# Patient Record
Sex: MALE | Race: OTHER | HISPANIC OR LATINO
[De-identification: names, ages, dates, MRNs, and addresses within clinical notes are randomized per-mention and may not be internally consistent; named-entity substitution may affect disease eponyms.]

---

## 2019-01-29 ENCOUNTER — APPOINTMENT (OUTPATIENT)
Dept: SURGERY | Facility: CLINIC | Age: 24
End: 2019-01-29
Payer: SELF-PAY

## 2019-01-29 VITALS
WEIGHT: 315 LBS | TEMPERATURE: 99.7 F | DIASTOLIC BLOOD PRESSURE: 77 MMHG | HEIGHT: 74 IN | SYSTOLIC BLOOD PRESSURE: 149 MMHG | BODY MASS INDEX: 40.43 KG/M2 | HEART RATE: 75 BPM

## 2019-01-29 DIAGNOSIS — Q43.3 CONGENITAL MALFORMATIONS OF INTESTINAL FIXATION: ICD-10-CM

## 2019-01-29 DIAGNOSIS — Z86.39 PERSONAL HISTORY OF OTHER ENDOCRINE, NUTRITIONAL AND METABOLIC DISEASE: ICD-10-CM

## 2019-01-29 DIAGNOSIS — Z83.3 FAMILY HISTORY OF DIABETES MELLITUS: ICD-10-CM

## 2019-01-29 DIAGNOSIS — F17.200 NICOTINE DEPENDENCE, UNSPECIFIED, UNCOMPLICATED: ICD-10-CM

## 2019-01-29 DIAGNOSIS — L05.01 PILONIDAL CYST WITH ABSCESS: ICD-10-CM

## 2019-01-29 DIAGNOSIS — Z82.49 FAMILY HISTORY OF ISCHEMIC HEART DISEASE AND OTHER DISEASES OF THE CIRCULATORY SYSTEM: ICD-10-CM

## 2019-01-29 DIAGNOSIS — L05.91 PILONIDAL CYST W/OUT ABSCESS: ICD-10-CM

## 2019-01-29 PROBLEM — Z00.00 ENCOUNTER FOR PREVENTIVE HEALTH EXAMINATION: Status: ACTIVE | Noted: 2019-01-29

## 2019-01-29 PROCEDURE — 99202 OFFICE O/P NEW SF 15 MIN: CPT

## 2019-01-29 RX ORDER — METFORMIN HYDROCHLORIDE 1000 MG/1
1000 TABLET, COATED ORAL
Refills: 0 | Status: ACTIVE | COMMUNITY

## 2019-01-29 NOTE — ASSESSMENT
[FreeTextEntry1] : 22 yo M with second occurrence of pilonidal cyst that spontaneously drained 1 day ago. There is no evidence of acute infection or drainable abscess at this time.

## 2019-01-29 NOTE — REVIEW OF SYSTEMS
[Fever] : no fever [Chills] : no chills [Feeling Poorly] : not feeling poorly [Feeling Tired] : not feeling tired [Negative] : Constitutional [FreeTextEntry1] : headaches

## 2019-01-29 NOTE — PLAN
[FreeTextEntry1] : Recommended careful attention to glucose control with frequent checks, keeping area clean and dry. He was prescribed antibiotics at urgent care which I recommended he start if he develops worsening symptoms with more pain, drainage, or worsening fingersticks. Should his symptoms recur, advised to call the office.

## 2019-01-29 NOTE — HISTORY OF PRESENT ILLNESS
[de-identified] : 22 yo M with history of pilonidal cyst about 1 year ago which was treated by a limited incision and drainage at an urgent care center presents with a recurrence that began 2 days ago. He felt pressure in the area and applied a warm compress. The following day it drained a brownish fluid spontaneously and since then has mostly subsided with decreased pain and drainage. He denies any fevers/chills. He is diabetic and his sugars have been well-controlled with glucose of 130 this am. He presented to an urgent care this am where he was prescribed antibiotics and referred to surgery.

## 2019-01-29 NOTE — PHYSICAL EXAM
[Respiratory Effort] : normal respiratory effort [Pilonidal Cyst] : a pilonidal cyst [de-identified] : NAD, well-appearing [de-identified] : s [de-identified] : Minimal skin excoriation just at gluteal fold, mild induration on left but no fluctuance, erythema or drainage that would be expressed; no significant discomfort with exam; tract opening probed but unable to clearly delineate

## 2019-02-14 ENCOUNTER — APPOINTMENT (OUTPATIENT)
Dept: SURGERY | Facility: CLINIC | Age: 24
End: 2019-02-14

## 2020-05-16 ENCOUNTER — TRANSCRIPTION ENCOUNTER (OUTPATIENT)
Age: 25
End: 2020-05-16

## 2024-02-11 ENCOUNTER — NON-APPOINTMENT (OUTPATIENT)
Age: 29
End: 2024-02-11

## 2024-12-03 ENCOUNTER — NON-APPOINTMENT (OUTPATIENT)
Age: 29
End: 2024-12-03